# Patient Record
Sex: MALE | Race: WHITE | NOT HISPANIC OR LATINO | Employment: UNEMPLOYED | URBAN - METROPOLITAN AREA
[De-identification: names, ages, dates, MRNs, and addresses within clinical notes are randomized per-mention and may not be internally consistent; named-entity substitution may affect disease eponyms.]

---

## 2020-02-19 ENCOUNTER — HOSPITAL ENCOUNTER (EMERGENCY)
Facility: HOSPITAL | Age: 30
Discharge: HOME | End: 2020-02-19
Attending: EMERGENCY MEDICINE | Admitting: EMERGENCY MEDICINE

## 2020-02-19 VITALS
DIASTOLIC BLOOD PRESSURE: 94 MMHG | SYSTOLIC BLOOD PRESSURE: 135 MMHG | HEART RATE: 103 BPM | BODY MASS INDEX: 18.96 KG/M2 | HEIGHT: 70 IN | TEMPERATURE: 98.8 F | OXYGEN SATURATION: 94 % | RESPIRATION RATE: 17 BRPM | WEIGHT: 132.4 LBS

## 2020-02-19 DIAGNOSIS — J02.9 VIRAL PHARYNGITIS: Primary | ICD-10-CM

## 2020-02-19 LAB
HETEROPH AB SER QL LA: NEGATIVE
S PYO AG THROAT QL: NEGATIVE

## 2020-02-19 PROCEDURE — 87880 STREP A ASSAY W/OPTIC: CPT

## 2020-02-19 PROCEDURE — 86308 HETEROPHILE ANTIBODY SCREEN: CPT | Performed by: PHYSICIAN ASSISTANT

## 2020-02-19 PROCEDURE — 99283 EMERGENCY DEPT VISIT LOW MDM: CPT

## 2020-02-19 PROCEDURE — 25010000002 DEXAMETHASONE SODIUM PHOSPHATE 20 MG/5ML SOLUTION: Performed by: PHYSICIAN ASSISTANT

## 2020-02-19 PROCEDURE — 87081 CULTURE SCREEN ONLY: CPT

## 2020-02-19 PROCEDURE — 96372 THER/PROPH/DIAG INJ SC/IM: CPT

## 2020-02-19 RX ORDER — DEXAMETHASONE SODIUM PHOSPHATE 4 MG/ML
10 INJECTION, SOLUTION INTRA-ARTICULAR; INTRALESIONAL; INTRAMUSCULAR; INTRAVENOUS; SOFT TISSUE ONCE
Status: COMPLETED | OUTPATIENT
Start: 2020-02-19 | End: 2020-02-19

## 2020-02-19 RX ADMIN — DEXAMETHASONE SODIUM PHOSPHATE 10 MG: 4 INJECTION, SOLUTION INTRA-ARTICULAR; INTRALESIONAL; INTRAMUSCULAR; INTRAVENOUS; SOFT TISSUE at 22:48

## 2020-02-19 NOTE — ED TRIAGE NOTES
Pt to ED with c/o feeling like something is in his throat.  Pt reports multiple episodes of choking without food involve.  Pt reports having a sore throat earlier in the day before this occurred.  Pt denies difficulty swallowing or SOA.

## 2020-02-20 NOTE — DISCHARGE INSTRUCTIONS
You may take tylenol as needed for pain per bottle instructions.  Return to the ER with any further concerns, should your condition change/worsen, or should you develop a fever.

## 2020-02-20 NOTE — ED PROVIDER NOTES
Pt presents to the ED c/o  sore throat that started today.  Patient stated he had a sensation of something stuck in his throat, but is able to swallow.  Does have pain with swallowing.  Has not had any measured fevers.  Has family history of recent strep throat that were rapid negative but grew out positive cultures.     On exam,   General: Awake, alert, no acute distress, nondiaphoretic  HEENT: EOMI, erythematous posterior oropharynx with minimal tonsillar hypertrophy, uvula midline, exudates noted, no drooling  Neck: Mild bilateral anterior cervical tenderness but no significant lymphadenopathy  Pulm: Symmetric chest rise, nonlabored breathing, no stridor, lungs are clear bilaterally, speaking in full and clear sentences  CV: Regular rate and rhythm, normal S1/S2  GI: Non-distended  MSK: No deformity, small 0.5 cm x 0.5 cm mobile nodule left anterolateral chest wall which is consistent with lipoma  Skin: Warm, dry  Neuro: Alert and oriented x 3, moving all extremities, no focal deficits  Psych: Calm, cooperative    Vital signs and nursing notes reviewed.         Plan:  Rapid strep negative, mono negative, plan for a dose of 10 mg of Decadron prior to discharge for likely viral pharyngitis.  Patient will be notified of positive result on strep culture.  Advised Tylenol for pain control over the next few days, can add NSAIDs over the weekend.  Increased water intake and foods as tolerated.  Will be given PCP follow-up information to establish a PCP.  Advised ED return for worsening symptoms as needed.       Attestation:  The JANNY and I have discussed this patient's history, physical exam, and treatment plan.  I have reviewed the documentation and personally had a face to face interaction with the patient. I affirm the documentation and agree with the treatment and plan.  The attached note describes my personal findings.            Jimmy Best MD  02/19/20 2020

## 2020-02-20 NOTE — ED PROVIDER NOTES
" EMERGENCY DEPARTMENT ENCOUNTER    CHIEF COMPLAINT  Chief Complaint: Sore throat  History given by: patient  History limited by: nothing  Room Number: 18/18  PMD: Provider, No Known      HPI:  Pt is a 30 y.o. male who presents complaining of a sore throat that started today. Pt also c/o HA, neck pain, pain with swallowing and chest tightness. Pt denies fever, chills, cough, sinus congestion and SOA. The pt woke up today feeling normal until several hours later, when he developed a sore throat, pain with swallowing, HA and neck pain. He states he was not eating at the time, however felt as if \"something was stuck in my throat causing me to suffocate.\" As the day progressed, his sx's seemed to worsen, so he came to the ED for further evaluation. The pt also expresses concern for pleurisy as he was diagnosed 2 years ago after experiencing chest tightness. He states over the past few weeks, he has noticed increased pressure and discomfort in his chest as well as a \"cold rush\" in his extremities which he believes is related to his heart. The pt has no known hx of obesity, hypertension, hyperlipidemia, diabetes or congenital heart defects. The pt is a non-smoker. The pt has a negative family history of first-degree relative for MI.     Duration:  Today  Onset: gradual  Timing: constant  Location: throat  Radiation: none stated  Quality: sore  Intensity/Severity: moderate  Progression: worsening  Associated Symptoms: HA, neck pain, pain with swallowing and chest tightness  Aggravating Factors: none stated  Alleviating Factors: none stated  Previous Episodes: none stated  Treatment before arrival: none stated    PAST MEDICAL HISTORY  Active Ambulatory Problems     Diagnosis Date Noted   • No Active Ambulatory Problems     Resolved Ambulatory Problems     Diagnosis Date Noted   • No Resolved Ambulatory Problems     No Additional Past Medical History       PAST SURGICAL HISTORY  History reviewed. No pertinent surgical " history.    FAMILY HISTORY  History reviewed. No pertinent family history.    SOCIAL HISTORY  Social History     Socioeconomic History   • Marital status: Single     Spouse name: Not on file   • Number of children: Not on file   • Years of education: Not on file   • Highest education level: Not on file   Tobacco Use   • Smoking status: Never Smoker   • Smokeless tobacco: Never Used   Substance and Sexual Activity   • Alcohol use: Never     Frequency: Never   • Drug use: Never       ALLERGIES  Patient has no known allergies.    REVIEW OF SYSTEMS  Review of Systems   Constitutional: Negative for activity change, appetite change and fever.   HENT: Positive for sore throat and trouble swallowing (pain). Negative for congestion.    Respiratory: Positive for chest tightness. Negative for cough and shortness of breath.    Cardiovascular: Negative for chest pain and leg swelling.   Gastrointestinal: Negative for abdominal pain, diarrhea and vomiting.   Genitourinary: Negative for decreased urine volume and dysuria.   Musculoskeletal: Positive for neck pain.   Skin: Negative for rash and wound.   Neurological: Positive for headaches. Negative for weakness and numbness.   All other systems reviewed and are negative.      PHYSICAL EXAM  ED Triage Vitals   Temp Heart Rate Resp BP SpO2   02/19/20 1841 02/19/20 1841 02/19/20 1841 02/19/20 2012 02/19/20 1841   98.8 °F (37.1 °C) (!) 129 17 137/84 100 %      Temp src Heart Rate Source Patient Position BP Location FiO2 (%)   02/19/20 1841 02/19/20 2012 -- -- --   Tympanic Monitor          Physical Exam   Constitutional: He is oriented to person, place, and time. No distress.   HENT:   Head: Normocephalic and atraumatic.   Mouth/Throat: Posterior oropharyngeal erythema present. No oropharyngeal exudate or tonsillar abscesses.   Eyes: Pupils are equal, round, and reactive to light. EOM are normal.   Neck: Normal range of motion. Neck supple.   Cardiovascular: Normal rate, regular  rhythm and normal heart sounds.   Pulmonary/Chest: Effort normal and breath sounds normal. No respiratory distress. He has no wheezes.   No stridor    Abdominal: Soft. There is no tenderness. There is no rebound and no guarding.   Musculoskeletal: Normal range of motion. He exhibits no edema.   Lymphadenopathy:     He has cervical adenopathy.   Neurological: He is alert and oriented to person, place, and time. He has normal sensation and normal strength.   Skin: Skin is warm and dry.   Psychiatric: Mood and affect normal. His mood appears anxious.   Nursing note and vitals reviewed.      LAB RESULTS  Lab Results (last 24 hours)     Procedure Component Value Units Date/Time    Rapid Strep A Screen - Swab, Throat [[id number]]  (Normal) Collected:  02/19/20 2016    Specimen:  Swab from Throat Updated:  02/19/20 2036     Strep A Ag Negative    Beta Strep Culture, Throat - Swab, Throat [[id number]] Collected:  02/19/20 2016    Specimen:  Swab from Throat Updated:  02/19/20 2036    Mononucleosis Screen [[id number]]  (Normal) Collected:  02/19/20 2139    Specimen:  Blood Updated:  02/19/20 2229     Monospot Negative          I ordered the above labs and reviewed the results    PROGRESS AND CONSULTS     2100 Mononucleosis screen ordered.     2240 Decadron ordered.     2232 Reviewed pt's history and workup with Dr. Best.  At bedside evaluation, they agree with the plan of care.    MEDICAL DECISION MAKING  Results were reviewed/discussed with the patient and they were also made aware of online access. Pt also made aware that some labs, such as cultures, will not be resulted during ER visit and follow up with PMD is necessary.       DIAGNOSIS  Final diagnoses:   Viral pharyngitis       DISPOSITION  DISCHARGE    Patient discharged in stable condition.    Reviewed implications of results, diagnosis, meds, responsibility to follow up, warning signs and symptoms of possible worsening, potential complications and reasons to return to  ER.    Patient/Family voiced understanding of above instructions.    Discussed plan for discharge, as there is no emergent indication for admission. Patient referred to primary care provider for BP management due to today's BP. Pt/family is agreeable and understands need for follow up and repeat testing.  Pt is aware that discharge does not mean that nothing is wrong but it indicates no emergency is present that requires admission and they must continue care with follow-up as given below or physician of their choice.     FOLLOW-UP  PATIENT LIAISON [place] [id number] [phone]  Schedule an appointment as soon as possible for a visit   For further evaluation and treatment, As needed         Medication List      No changes were made to your prescriptions during this visit.       Latest Documented Vital Signs:  As of 5:33 AM  BP- 135/94 HR- 103 Temp- 98.8 °F (37.1 °C) (Tympanic) O2 sat- 94%    --  Documentation assistance provided by rosalino Carreon for CARLOS Mcneill.  Information recorded by the rosalino was done at my direction and has been verified and validated by me.       Lauri Dejesus  02/19/20 5511       Jayce Petersen III, PA  02/20/20 8501

## 2020-02-21 LAB — BACTERIA SPEC AEROBE CULT: NORMAL

## 2020-03-03 ENCOUNTER — HOSPITAL ENCOUNTER (OUTPATIENT)
Dept: GENERAL RADIOLOGY | Facility: HOSPITAL | Age: 30
Discharge: HOME | End: 2020-03-03
Admitting: INTERNAL MEDICINE

## 2020-03-03 ENCOUNTER — OFFICE VISIT (OUTPATIENT)
Dept: INTERNAL MEDICINE | Age: 30
End: 2020-03-03

## 2020-03-03 VITALS
WEIGHT: 130 LBS | BODY MASS INDEX: 18.61 KG/M2 | DIASTOLIC BLOOD PRESSURE: 74 MMHG | SYSTOLIC BLOOD PRESSURE: 132 MMHG | HEART RATE: 74 BPM | HEIGHT: 70 IN | TEMPERATURE: 97.1 F | RESPIRATION RATE: 13 BRPM | OXYGEN SATURATION: 99 %

## 2020-03-03 DIAGNOSIS — R07.89 ATYPICAL CHEST PAIN: ICD-10-CM

## 2020-03-03 DIAGNOSIS — Z76.89 ESTABLISHING CARE WITH NEW DOCTOR, ENCOUNTER FOR: ICD-10-CM

## 2020-03-03 DIAGNOSIS — M94.0 COSTOCHONDRITIS: Primary | ICD-10-CM

## 2020-03-03 PROCEDURE — 99204 OFFICE O/P NEW MOD 45 MIN: CPT | Performed by: INTERNAL MEDICINE

## 2020-03-03 PROCEDURE — 71046 X-RAY EXAM CHEST 2 VIEWS: CPT

## 2020-03-03 NOTE — PROGRESS NOTES
"  Thanh Dowling is a 30 y.o. male who presents with   Chief Complaint   Patient presents with   • Atypical chest pain/costochondritis     Off/on-2 years   • Establishing care with new physician     Former resident of [facility]   .    30-year-old male.  New patient.  Initial visit.  Former resident of [facility].  Presents with a rather strange set of symptoms involving mainly the chest area.  He says that he has had chest symptoms off and on for the past 2 years and describes them in various ways.  One way is a \"strange heart feeling\".  He says he can put his hand up to his chest and \"barely feel my heart beating\".  He also describes some atypical primarily one-sided pain to the left pectoral region which he describes as \"feeling like something is shifting inside\".  He also describes a sensation where his arms \"get cold and wet feeling\".  He said the symptoms were such that about 2 years ago while living in [facility] he went to a local immediate care center for similar complaints.  There an EKG was done along with chest x-ray and blood work and he was told that in view of the negativity of all findings that he likely had \"costochondritis\".  None of the symptoms appear to be related to food or exertion and there is no nausea, vomiting or diaphoresis associated with any of his symptoms.       /74   Pulse 74   Temp 97.1 °F (36.2 °C) (Temporal)   Resp 13   Ht 177.8 cm (70\")   Wt 59 kg (130 lb)   SpO2 99%   BMI 18.65 kg/m²     The following portions of the patient's history were reviewed and updated as appropriate: allergies, current medications, past medical history and problem list.    Review of Systems   Constitutional: Positive for fever.   HENT: Negative.    Eyes: Negative.    Respiratory: Positive for chest tightness. Negative for shortness of breath and wheezing.    Cardiovascular: Negative.    Gastrointestinal: Negative for abdominal pain and nausea.   Genitourinary: Negative.  "   Musculoskeletal: Negative.    Skin: Negative.    Neurological: Negative.    Psychiatric/Behavioral: Negative.        Objective   Physical Exam   Constitutional: He is oriented to person, place, and time. He appears well-developed and well-nourished. No distress.   HENT:   Head: Normocephalic and atraumatic.   Eyes: Pupils are equal, round, and reactive to light. Conjunctivae and EOM are normal.   Neck: Normal range of motion. Neck supple. No thyromegaly present.   Neck exam negative.  Carotid auscultation normal-no bruits heard.   Cardiovascular: Normal rate, regular rhythm, normal heart sounds and intact distal pulses. Exam reveals no gallop and no friction rub.   No murmur heard.  Pulmonary/Chest: Effort normal and breath sounds normal. No respiratory distress. He has no wheezes. He has no rales. He exhibits no tenderness.   Abdominal: Soft. Bowel sounds are normal. He exhibits no distension and no mass. There is no tenderness. There is no rebound and no guarding.   Neurological: He is alert and oriented to person, place, and time.   Psychiatric: He has a normal mood and affect. His behavior is normal. Judgment and thought content normal.   Nursing note and vitals reviewed.      Assessment/Plan   Thanh was seen today for atypical chest pain/costochondritis and establishing care with new physician.    Diagnoses and all orders for this visit:    Costochondritis  -     Comprehensive Metabolic Panel  -     Lipid Panel  -     CBC & Differential  -     TSH+Free T4  -     XR Chest PA & Lateral    Establishing care with new doctor, encounter for    Atypical chest pain  -     Lipid Panel  -     CBC & Differential  -     TSH+Free T4  -     XR Chest PA & Lateral      Plan: Atypical chest discomfort as described above sounds more like chest wall and/or costochondritis type symptoms than anything cardiac.  We will get labs as above and get him scheduled for a chest x-ray today.  At this point I see nothing that suggests any  "cardiac etiology of his symptoms and therefore an EKG will not be done on today's visit nor will a cardiology referral be necessary.  If problems persist however or if he so chooses then we may need to go ahead and get him set up for that later.    I have suggested OTC Advil as needed for symptoms or Tylenol Extra Strength as needed for symptoms.    Assuming today's labs are acceptable I have suggested we see him in 6 months or so and get him set up for a physical with comprehensive lab updates then.    He also is having some TMJ-like symptoms in his jaw.  He says whenever he opens to yawn or chew his jaw will sometimes \"get stuck\".  He is to see his dentist in the near future for this he says.         "

## 2020-03-04 ENCOUNTER — TELEPHONE (OUTPATIENT)
Dept: INTERNAL MEDICINE | Age: 30
End: 2020-03-04

## 2020-03-04 LAB
ALBUMIN SERPL-MCNC: 5 G/DL (ref 3.5–5.2)
ALBUMIN/GLOB SERPL: 2 G/DL
ALP SERPL-CCNC: 76 U/L (ref 39–117)
ALT SERPL-CCNC: 13 U/L (ref 1–41)
AST SERPL-CCNC: 15 U/L (ref 1–40)
BASOPHILS # BLD AUTO: 0.04 10*3/MM3 (ref 0–0.2)
BASOPHILS NFR BLD AUTO: 0.6 % (ref 0–1.5)
BILIRUB SERPL-MCNC: 0.3 MG/DL (ref 0.2–1.2)
BUN SERPL-MCNC: 7 MG/DL (ref 6–20)
BUN/CREAT SERPL: 9.9 (ref 7–25)
CALCIUM SERPL-MCNC: 9.5 MG/DL (ref 8.6–10.5)
CHLORIDE SERPL-SCNC: 101 MMOL/L (ref 98–107)
CHOLEST SERPL-MCNC: 130 MG/DL (ref 0–200)
CO2 SERPL-SCNC: 27.9 MMOL/L (ref 22–29)
CREAT SERPL-MCNC: 0.71 MG/DL (ref 0.76–1.27)
EOSINOPHIL # BLD AUTO: 0.16 10*3/MM3 (ref 0–0.4)
EOSINOPHIL NFR BLD AUTO: 2.6 % (ref 0.3–6.2)
ERYTHROCYTE [DISTWIDTH] IN BLOOD BY AUTOMATED COUNT: 12.4 % (ref 12.3–15.4)
GLOBULIN SER CALC-MCNC: 2.5 GM/DL
GLUCOSE SERPL-MCNC: 103 MG/DL (ref 65–99)
HCT VFR BLD AUTO: 40.9 % (ref 37.5–51)
HDLC SERPL-MCNC: 53 MG/DL (ref 40–60)
HGB BLD-MCNC: 14.5 G/DL (ref 13–17.7)
IMM GRANULOCYTES # BLD AUTO: 0.02 10*3/MM3 (ref 0–0.05)
IMM GRANULOCYTES NFR BLD AUTO: 0.3 % (ref 0–0.5)
LDLC SERPL CALC-MCNC: 58 MG/DL (ref 0–100)
LYMPHOCYTES # BLD AUTO: 1.59 10*3/MM3 (ref 0.7–3.1)
LYMPHOCYTES NFR BLD AUTO: 25.7 % (ref 19.6–45.3)
MCH RBC QN AUTO: 32.6 PG (ref 26.6–33)
MCHC RBC AUTO-ENTMCNC: 35.5 G/DL (ref 31.5–35.7)
MCV RBC AUTO: 91.9 FL (ref 79–97)
MONOCYTES # BLD AUTO: 0.66 10*3/MM3 (ref 0.1–0.9)
MONOCYTES NFR BLD AUTO: 10.7 % (ref 5–12)
NEUTROPHILS # BLD AUTO: 3.71 10*3/MM3 (ref 1.7–7)
NEUTROPHILS NFR BLD AUTO: 60.1 % (ref 42.7–76)
NRBC BLD AUTO-RTO: 0 /100 WBC (ref 0–0.2)
PLATELET # BLD AUTO: 249 10*3/MM3 (ref 140–450)
POTASSIUM SERPL-SCNC: 3.6 MMOL/L (ref 3.5–5.2)
PROT SERPL-MCNC: 7.5 G/DL (ref 6–8.5)
RBC # BLD AUTO: 4.45 10*6/MM3 (ref 4.14–5.8)
SODIUM SERPL-SCNC: 141 MMOL/L (ref 136–145)
T4 FREE SERPL-MCNC: 1.41 NG/DL (ref 0.93–1.7)
TRIGL SERPL-MCNC: 96 MG/DL (ref 0–150)
TSH SERPL DL<=0.005 MIU/L-ACNC: 1.85 UIU/ML (ref 0.27–4.2)
VLDLC SERPL CALC-MCNC: 19.2 MG/DL
WBC # BLD AUTO: 6.18 10*3/MM3 (ref 3.4–10.8)

## 2020-03-23 ENCOUNTER — OFFICE VISIT (OUTPATIENT)
Dept: INTERNAL MEDICINE | Age: 30
End: 2020-03-23

## 2020-03-23 ENCOUNTER — TELEPHONE (OUTPATIENT)
Dept: INTERNAL MEDICINE | Age: 30
End: 2020-03-23

## 2020-03-23 VITALS
TEMPERATURE: 98.7 F | OXYGEN SATURATION: 99 % | BODY MASS INDEX: 18.35 KG/M2 | HEIGHT: 70 IN | WEIGHT: 128.2 LBS | HEART RATE: 78 BPM

## 2020-03-23 DIAGNOSIS — H10.32 ACUTE CONJUNCTIVITIS OF LEFT EYE, UNSPECIFIED ACUTE CONJUNCTIVITIS TYPE: Primary | ICD-10-CM

## 2020-03-23 PROCEDURE — 99213 OFFICE O/P EST LOW 20 MIN: CPT | Performed by: INTERNAL MEDICINE

## 2020-03-23 RX ORDER — TOBRAMYCIN 3 MG/ML
SOLUTION/ DROPS OPHTHALMIC
Qty: 5 ML | Refills: 0 | Status: SHIPPED | OUTPATIENT
Start: 2020-03-23

## 2020-03-23 RX ORDER — PREDNISOLONE ACETATE 10 MG/ML
1 SUSPENSION/ DROPS OPHTHALMIC 3 TIMES DAILY
Qty: 5 ML | Refills: 0 | Status: SHIPPED | OUTPATIENT
Start: 2020-03-23

## 2020-03-23 NOTE — TELEPHONE ENCOUNTER
Dr. Downey office contacted, it was an answering service. Amanda advised that office was closed and there would be no way of getting records r/t treatment today. Amanda also stated that Release of Auth would need to be faxed to [phone].    Pt was advised of this information and was offered to move his appt up to today. Pt agreed and was placed on schedule. KELSY

## 2020-03-23 NOTE — TELEPHONE ENCOUNTER
Patient is complaning of eye irritation left eye getting sore and irritated for several days some sensitivity to light has had this happen several times in the past and the eye drop you had given him before helped.  He is on the schedule for wed but wanted to know if he could get the eye drops now or if he even needs to come in and if he could come in sooner.  Please call patient at [phone]

## 2020-03-23 NOTE — PROGRESS NOTES
"  Thanh Dowlign is a 30 y.o. male who presents with   Chief Complaint   Patient presents with   • Left eye red     2-3 days.  Crusted especially in the morning.  No fever or chills   .    30-year-old male.  History as above.       Pulse 78   Temp 98.7 °F (37.1 °C)   Ht 177.8 cm (70\")   Wt 58.2 kg (128 lb 3.2 oz)   SpO2 99%   BMI 18.39 kg/m²     The following portions of the patient's history were reviewed and updated as appropriate: allergies, current medications and problem list.    Review of Systems   Constitutional: Negative.    Eyes: Positive for photophobia, discharge and redness.   Respiratory: Negative.    Cardiovascular: Negative.    Neurological: Negative.    Psychiatric/Behavioral: Negative.        Objective   Physical Exam   Constitutional: He is oriented to person, place, and time. He appears well-developed and well-nourished.   HENT:   Head: Normocephalic and atraumatic.   Eyes: Pupils are equal, round, and reactive to light. EOM are normal. Left eye exhibits discharge. No scleral icterus.   Patient with conjunctivitis of the left eye.  Subjectively no problems with vision.  Tearing of the eye noted photophobia noted.   Cardiovascular: Normal rate.   Pulmonary/Chest: Effort normal.   Neurological: He is alert and oriented to person, place, and time.   Psychiatric: He has a normal mood and affect. His behavior is normal. Judgment and thought content normal.   Nursing note and vitals reviewed.      Assessment/Plan   Thanh was seen today for left eye red.    Diagnoses and all orders for this visit:    Acute conjunctivitis of left eye, unspecified acute conjunctivitis type    Other orders  -     tobramycin (Tobrex) 0.3 % solution ophthalmic solution; 1 drop 3 times a day to both eyes  -     prednisoLONE acetate (PRED FORTE) 1 % ophthalmic suspension; Administer 1 drop to both eyes 3 (Three) Times a Day.      Plan: Treatment as above.  Ophthalmology evaluation if problems persist         "

## 2020-12-22 ENCOUNTER — TELEPHONE (OUTPATIENT)
Dept: INTERNAL MEDICINE | Age: 30
End: 2020-12-22

## 2020-12-29 ENCOUNTER — TELEPHONE (OUTPATIENT)
Dept: INTERNAL MEDICINE | Age: 30
End: 2020-12-29

## 2021-04-16 ENCOUNTER — BULK ORDERING (OUTPATIENT)
Dept: CASE MANAGEMENT | Facility: OTHER | Age: 31
End: 2021-04-16

## 2021-04-16 DIAGNOSIS — Z23 IMMUNIZATION DUE: ICD-10-CM
